# Patient Record
Sex: FEMALE | URBAN - METROPOLITAN AREA
[De-identification: names, ages, dates, MRNs, and addresses within clinical notes are randomized per-mention and may not be internally consistent; named-entity substitution may affect disease eponyms.]

---

## 2019-08-30 ENCOUNTER — OFFICE VISIT (OUTPATIENT)
Dept: URBAN - METROPOLITAN AREA CLINIC 11 | Facility: CLINIC | Age: 54
End: 2019-08-30
Payer: COMMERCIAL

## 2019-08-30 PROCEDURE — 92004 COMPRE OPH EXAM NEW PT 1/>: CPT | Performed by: OPHTHALMOLOGY

## 2019-08-30 PROCEDURE — 92133 CPTRZD OPH DX IMG PST SGM ON: CPT | Performed by: OPHTHALMOLOGY

## 2019-08-30 PROCEDURE — 92020 GONIOSCOPY: CPT | Performed by: OPHTHALMOLOGY

## 2019-08-30 PROCEDURE — 76514 ECHO EXAM OF EYE THICKNESS: CPT | Performed by: OPHTHALMOLOGY

## 2019-08-30 RX ORDER — BIMATOPROST 0.1 MG/ML
0.01 % SOLUTION/ DROPS OPHTHALMIC
Qty: 1 | Refills: 11 | Status: INACTIVE
Start: 2019-08-30 | End: 2019-11-07

## 2019-08-30 ASSESSMENT — INTRAOCULAR PRESSURE
OD: 23
OS: 29
OD: 26
OS: 26

## 2019-08-30 ASSESSMENT — KERATOMETRY
OD: 46.13
OS: 46.88

## 2019-08-30 NOTE — IMPRESSION/PLAN
Impression: Ocular hypertension, bilateral: H40.053. /551,  OCT 89/88  Plan: Ocular hypertension, intraocular pressure too high, will start medication(s). Start Lumigan QHS OU (sample dispensed to patient) Diagnosis explained and patient verbalized understanding. Will continue to monitor. 4-6 week IOP check, Optos and HVF with Dr Eva Rees.

## 2019-10-18 ENCOUNTER — OFFICE VISIT (OUTPATIENT)
Dept: URBAN - METROPOLITAN AREA CLINIC 11 | Facility: CLINIC | Age: 54
End: 2019-10-18
Payer: COMMERCIAL

## 2019-10-18 PROCEDURE — 99213 OFFICE O/P EST LOW 20 MIN: CPT | Performed by: OPHTHALMOLOGY

## 2019-10-18 ASSESSMENT — INTRAOCULAR PRESSURE
OS: 27
OD: 20

## 2019-10-18 NOTE — IMPRESSION/PLAN
Impression: Ocular hypertension, bilateral: H40.053. /551, 8/19 OCT 89/88 9/7 Plan: Ocular hypertension. IOP improved OD, elevated OS. Recommend patient continue Lumigan QHS OU. Will continue to monitor. Obtain HVF. Follow up IOP check and review HVF with patient in 3 months with Dr Hola Marti.

## 2019-10-21 ENCOUNTER — TESTING ONLY (OUTPATIENT)
Dept: URBAN - METROPOLITAN AREA CLINIC 11 | Facility: CLINIC | Age: 54
End: 2019-10-21
Payer: COMMERCIAL

## 2019-10-21 PROCEDURE — 92083 EXTENDED VISUAL FIELD XM: CPT | Performed by: OPHTHALMOLOGY

## 2020-01-30 ENCOUNTER — OFFICE VISIT (OUTPATIENT)
Dept: URBAN - METROPOLITAN AREA CLINIC 11 | Facility: CLINIC | Age: 55
End: 2020-01-30
Payer: COMMERCIAL

## 2020-01-30 PROCEDURE — 99202 OFFICE O/P NEW SF 15 MIN: CPT | Performed by: OPTOMETRIST

## 2020-01-30 PROCEDURE — 99213 OFFICE O/P EST LOW 20 MIN: CPT | Performed by: OPTOMETRIST

## 2020-01-30 ASSESSMENT — INTRAOCULAR PRESSURE
OD: 20
OS: 24

## 2020-01-30 NOTE — IMPRESSION/PLAN
Impression: Ocular hypertension, bilateral: H40.053. /551, 8/19 OCT 89/88 9/7 Plan: Ocular hypertension. IOP  good level today OU . Recommend patient continue Lumigan QHS OU. Will continue to monitor. Follow up 4 months IOP check and photos.

## 2020-05-19 ENCOUNTER — OFFICE VISIT (OUTPATIENT)
Dept: URBAN - METROPOLITAN AREA CLINIC 11 | Facility: CLINIC | Age: 55
End: 2020-05-19
Payer: COMMERCIAL

## 2020-05-19 DIAGNOSIS — H40.053 OCULAR HYPERTENSION, BILATERAL: Primary | ICD-10-CM

## 2020-05-19 PROCEDURE — 92250 FUNDUS PHOTOGRAPHY W/I&R: CPT | Performed by: OPTOMETRIST

## 2020-05-19 PROCEDURE — 92012 INTRM OPH EXAM EST PATIENT: CPT | Performed by: OPTOMETRIST

## 2020-05-19 ASSESSMENT — INTRAOCULAR PRESSURE
OS: 26
OD: 21

## 2020-05-19 NOTE — IMPRESSION/PLAN
Impression: Ocular hypertension, bilateral: H40.053. /551, 8/19 OCT 89/88 9/7 Plan: Ocular hypertension. IOP slightly elevated today OU . continue Lumigan QHS OU. Will continue to monitor. Schedule VF 24-2. VF results need to be sent to her neurologist due to pituitary tumor.  Follow up 1-2 weeks VF, IOP, DE and OCT RNFL

## 2020-06-15 ENCOUNTER — OFFICE VISIT (OUTPATIENT)
Dept: URBAN - METROPOLITAN AREA CLINIC 11 | Facility: CLINIC | Age: 55
End: 2020-06-15
Payer: COMMERCIAL

## 2020-06-15 PROCEDURE — 92014 COMPRE OPH EXAM EST PT 1/>: CPT | Performed by: OPTOMETRIST

## 2020-06-15 PROCEDURE — 92083 EXTENDED VISUAL FIELD XM: CPT | Performed by: OPTOMETRIST

## 2020-06-15 PROCEDURE — 92133 CPTRZD OPH DX IMG PST SGM ON: CPT | Performed by: OPTOMETRIST

## 2020-06-15 ASSESSMENT — INTRAOCULAR PRESSURE
OD: 20
OS: 21

## 2020-06-15 NOTE — IMPRESSION/PLAN
Impression: Ocular hypertension, bilateral: H40.053. /551, OCT 06/20 8/9 89/85 (89/88) HVF 06/20 OD: Full; OS: Full Plan: VF and OCT RNFL ordered and preformed today. VF and OCT normal OU. IOP acceptable level today OU . Continue Latanoprost QHS OU. Will continue to monitor.  F/U 4 months IOP & Photos

## 2020-09-30 ENCOUNTER — OFFICE VISIT (OUTPATIENT)
Dept: URBAN - METROPOLITAN AREA CLINIC 11 | Facility: CLINIC | Age: 55
End: 2020-09-30
Payer: COMMERCIAL

## 2020-09-30 PROCEDURE — 92250 FUNDUS PHOTOGRAPHY W/I&R: CPT | Performed by: OPTOMETRIST

## 2020-09-30 PROCEDURE — 92012 INTRM OPH EXAM EST PATIENT: CPT | Performed by: OPTOMETRIST

## 2020-09-30 RX ORDER — LATANOPROST 50 UG/ML
0.005 % SOLUTION OPHTHALMIC
Qty: 2.5 | Refills: 2 | Status: INACTIVE
Start: 2020-09-30 | End: 2021-01-22

## 2020-09-30 ASSESSMENT — INTRAOCULAR PRESSURE
OS: 24
OD: 19

## 2020-09-30 NOTE — ASSESSMENT/PLAN
Impression: Photo Disc - OD: Good-rim intact; OS Good-rim intact Plan: Continue drops. No change in treatment based on test results.

## 2020-09-30 NOTE — IMPRESSION/PLAN
Impression: Ocular hypertension, bilateral: H40.053. /551, OCT 06/20 8/9 89/85 (89/88) HVF 06/20 OD: Full; OS: Full Photos 09/20 Plan: Photos of the optic nerve ordered and preformed today. IOP slightly elevated OS. Continue Latanoprost QHS OU. Will continue to monitor.  F/U 4 months IOP

## 2021-02-02 ENCOUNTER — OFFICE VISIT (OUTPATIENT)
Dept: URBAN - METROPOLITAN AREA CLINIC 11 | Facility: CLINIC | Age: 56
End: 2021-02-02
Payer: COMMERCIAL

## 2021-02-02 PROCEDURE — 92134 CPTRZ OPH DX IMG PST SGM RTA: CPT | Performed by: OPTOMETRIST

## 2021-02-02 PROCEDURE — 99213 OFFICE O/P EST LOW 20 MIN: CPT | Performed by: OPTOMETRIST

## 2021-02-02 ASSESSMENT — INTRAOCULAR PRESSURE
OD: 22
OS: 24

## 2021-02-02 NOTE — IMPRESSION/PLAN
Impression: Ocular hypertension, bilateral: H40.053. Tmax 26/29 /551, OCT 06/20 8/9 89/85 (89/88) HVF 06/20 OD: Full; OS: Full Photos 09/20 [facility] 02/21 84/83 Plan: MAC OCT ([facility]) ordered and preformed today. Normal OU. IOP slightly elevated OU. Continue Latanoprost QHS OU. Will continue to monitor.  F/U 4 months IOP, DE & OCT RNFL

## 2021-06-02 ENCOUNTER — OFFICE VISIT (OUTPATIENT)
Dept: URBAN - METROPOLITAN AREA CLINIC 11 | Facility: CLINIC | Age: 56
End: 2021-06-02
Payer: COMMERCIAL

## 2021-06-02 DIAGNOSIS — E11.9 TYPE 2 DIABETES MELLITUS W/O COMPLICATION: ICD-10-CM

## 2021-06-02 PROCEDURE — 92014 COMPRE OPH EXAM EST PT 1/>: CPT | Performed by: OPTOMETRIST

## 2021-06-02 PROCEDURE — 92133 CPTRZD OPH DX IMG PST SGM ON: CPT | Performed by: OPTOMETRIST

## 2021-06-02 ASSESSMENT — INTRAOCULAR PRESSURE
OS: 21
OD: 20

## 2021-06-02 NOTE — IMPRESSION/PLAN
Impression: Ocular hypertension, bilateral: H40.053. Tmax 26/29 /551, OCT 06/21 8/8 92/81 (89/85)(89/88) HVF 06/20 OD: Full; OS: Full Photos 09/20 1808 [facility] 02/21 84/83 Plan: OCT RNFL ordered and preformed today. Normal OU. IOP at good level today OU. Continue Latanoprost QHS OU. Will continue to monitor.  f/u 6 months IOP & VF

## 2021-06-02 NOTE — IMPRESSION/PLAN
Impression: Type 2 diabetes mellitus w/o complication: J98.2. Plan: No signs of retinopathy or neovascularization noted. Discussed ocular and systemic benefits of blood sugar control. Continue blood sugar control.  RTC 1yr complete exam

## 2021-12-08 ENCOUNTER — OFFICE VISIT (OUTPATIENT)
Dept: URBAN - METROPOLITAN AREA CLINIC 11 | Facility: CLINIC | Age: 56
End: 2021-12-08
Payer: COMMERCIAL

## 2021-12-08 PROCEDURE — 92083 EXTENDED VISUAL FIELD XM: CPT | Performed by: OPTOMETRIST

## 2021-12-08 PROCEDURE — 99213 OFFICE O/P EST LOW 20 MIN: CPT | Performed by: OPTOMETRIST

## 2021-12-08 ASSESSMENT — INTRAOCULAR PRESSURE
OS: 24
OD: 22

## 2021-12-08 NOTE — IMPRESSION/PLAN
Impression: Ocular hypertension, bilateral: H40.053. Tmax 26/29; /551; OCT 06/21 8/8 92/81 (89/85)(89/88) HVF 12/21 OD: Full; OS: Full; Photos 09/20; 1808 [facility] 02/21 84/83 Plan: VF ordered and preformed today. VF normal OU. IOP slightly elevated OU. Continue Latanoprost QHS OU. Will continue to monitor.  f/u 6 months DE and OCT RNFL

## 2022-09-27 ENCOUNTER — OFFICE VISIT (OUTPATIENT)
Facility: LOCATION | Age: 57
End: 2022-09-27
Payer: COMMERCIAL

## 2022-09-27 DIAGNOSIS — H40.053 OCULAR HYPERTENSION, BILATERAL: Primary | ICD-10-CM

## 2022-09-27 DIAGNOSIS — H25.13 AGE-RELATED NUCLEAR CATARACT, BILATERAL: ICD-10-CM

## 2022-09-27 PROCEDURE — 92002 INTRM OPH EXAM NEW PATIENT: CPT | Performed by: OPTOMETRIST

## 2022-09-27 PROCEDURE — 92133 CPTRZD OPH DX IMG PST SGM ON: CPT | Performed by: OPTOMETRIST

## 2022-09-27 RX ORDER — LATANOPROST 50 UG/ML
0.005 % SOLUTION OPHTHALMIC
Qty: 2.5 | Refills: 11 | Status: ACTIVE
Start: 2022-09-27

## 2022-09-27 ASSESSMENT — INTRAOCULAR PRESSURE
OS: 21
OD: 20
OD: 21

## 2022-09-27 NOTE — IMPRESSION/PLAN
Impression: Ocular hypertension, bilateral: H40.053. Tmax 26/29; /551; OCT 06/21 8/8 92/81 (89/85)(89/88) HVF 12/21 OD: Full; OS: Full; Photos 09/20; 1808 [facility] 02/21 84/83 Plan: IOP stable OU. RNFL performed today with no thinning and remains stable. Contiune Latanoprost QHS OU. Return in 6 months with dilation and VF 24-2 OU. Tmax 26/29 Target 21 or below Treatment Latanoporst QHS OU

## 2023-03-27 ENCOUNTER — OFFICE VISIT (OUTPATIENT)
Facility: LOCATION | Age: 58
End: 2023-03-27
Payer: COMMERCIAL

## 2023-03-27 DIAGNOSIS — H40.053 OCULAR HYPERTENSION, BILATERAL: Primary | ICD-10-CM

## 2023-03-27 PROCEDURE — 99213 OFFICE O/P EST LOW 20 MIN: CPT | Performed by: OPTOMETRIST

## 2023-03-27 PROCEDURE — 92083 EXTENDED VISUAL FIELD XM: CPT | Performed by: OPTOMETRIST

## 2023-03-27 RX ORDER — LATANOPROST 50 UG/ML
0.005 % SOLUTION OPHTHALMIC
Qty: 2.5 | Refills: 4 | Status: ACTIVE
Start: 2023-03-27

## 2023-03-27 ASSESSMENT — KERATOMETRY
OD: 47.25
OS: 43.50

## 2023-03-27 ASSESSMENT — INTRAOCULAR PRESSURE
OD: 16
OS: 17

## 2023-03-27 NOTE — IMPRESSION/PLAN
Impression: Ocular hypertension, bilateral: H40.053. Plan: Condition and IOP are stable today. No changes being made to current treatment at this time. Continue Latnaoprost QHS OU. Emphasized and explained compliance. Reassured patient of current condition and treatment and discussed risks of glaucoma progression. Will continue to monitor IOP. IOP: 16/16 Tmax 26/29 Target 21 or below Treatment Latanoporst QHS OU
C/D ratio: 0.3/0.3 OCTG (date):
24-2 (3/27/23): FUL OU Pachy (date): Allergies: none Surgery: none Family History: unk Notes:

## 2023-04-03 ENCOUNTER — OFFICE VISIT (OUTPATIENT)
Facility: LOCATION | Age: 58
End: 2023-04-03
Payer: COMMERCIAL

## 2023-04-03 DIAGNOSIS — H40.053 OCULAR HYPERTENSION, BILATERAL: ICD-10-CM

## 2023-04-03 DIAGNOSIS — H25.13 AGE-RELATED NUCLEAR CATARACT, BILATERAL: ICD-10-CM

## 2023-04-03 DIAGNOSIS — E11.9 TYPE 2 DIABETES MELLITUS W/O COMPLICATION: Primary | ICD-10-CM

## 2023-04-03 PROCEDURE — 92014 COMPRE OPH EXAM EST PT 1/>: CPT | Performed by: OPTOMETRIST

## 2023-04-03 ASSESSMENT — INTRAOCULAR PRESSURE
OD: 23
OS: 24
OS: 23
OD: 21

## 2023-04-03 NOTE — IMPRESSION/PLAN
Impression: Ocular hypertension, bilateral: H40.053. Plan: IOPs elevated today. Patient reports recently missing Latanoprost dosage. No changes being made to current treatment at this time. Continue Latnaoprost QHS OU. Emphasized and explained compliance. Reassured patient of current condition and treatment and discussed risks of glaucoma progression. Will continue to monitor IOP. RTC: 3 month IOP check with [facility] OCT

IOP: 23/23 Tmax 26/29 Target 21 or below Treatment Latanoporst QHS OU
C/D ratio: 0.3/0.3
24-2 (3/27/23): FUL OU Allergies: none Surgery: none Family History: unknown Notes:

## 2023-04-03 NOTE — IMPRESSION/PLAN
Impression: Type 2 diabetes mellitus w/o complication: V66.6. Plan: Diabetes type II: no background retinopathy, no signs of neovascularization noted. Discussed ocular and systemic benefits of blood sugar control.

## 2023-07-03 ENCOUNTER — OFFICE VISIT (OUTPATIENT)
Facility: LOCATION | Age: 58
End: 2023-07-03
Payer: COMMERCIAL

## 2023-07-03 DIAGNOSIS — H40.053 OCULAR HYPERTENSION, BILATERAL: Primary | ICD-10-CM

## 2023-07-03 PROCEDURE — 99213 OFFICE O/P EST LOW 20 MIN: CPT | Performed by: OPTOMETRIST

## 2023-07-03 PROCEDURE — 92134 CPTRZ OPH DX IMG PST SGM RTA: CPT | Performed by: OPTOMETRIST

## 2023-07-03 ASSESSMENT — INTRAOCULAR PRESSURE
OS: 18
OD: 14

## 2024-11-26 ENCOUNTER — OFFICE VISIT (OUTPATIENT)
Facility: LOCATION | Age: 59
End: 2024-11-26
Payer: COMMERCIAL

## 2024-11-26 DIAGNOSIS — H04.123 TEAR FILM INSUFFICIENCY OF BILATERAL LACRIMAL GLANDS: ICD-10-CM

## 2024-11-26 DIAGNOSIS — H40.053 OCULAR HYPERTENSION, BILATERAL: ICD-10-CM

## 2024-11-26 DIAGNOSIS — E11.9 TYPE 2 DIABETES MELLITUS W/O COMPLICATION: Primary | ICD-10-CM

## 2024-11-26 DIAGNOSIS — H25.13 AGE-RELATED NUCLEAR CATARACT, BILATERAL: ICD-10-CM

## 2024-11-26 PROCEDURE — 92014 COMPRE OPH EXAM EST PT 1/>: CPT | Performed by: OPTOMETRIST

## 2024-11-26 ASSESSMENT — INTRAOCULAR PRESSURE
OS: 17
OD: 17
OS: 20

## 2024-11-26 ASSESSMENT — KERATOMETRY
OD: 45.88
OS: 46.38

## 2024-11-26 ASSESSMENT — VISUAL ACUITY
OS: 20/20
OD: 20/20

## 2025-05-27 ENCOUNTER — OFFICE VISIT (OUTPATIENT)
Facility: LOCATION | Age: 60
End: 2025-05-27
Payer: COMMERCIAL

## 2025-05-27 DIAGNOSIS — H40.053 OCULAR HYPERTENSION, BILATERAL: Primary | ICD-10-CM

## 2025-05-27 PROCEDURE — 99213 OFFICE O/P EST LOW 20 MIN: CPT | Performed by: OPTOMETRIST

## 2025-05-27 PROCEDURE — 92133 CPTRZD OPH DX IMG PST SGM ON: CPT | Performed by: OPTOMETRIST

## 2025-05-27 RX ORDER — LATANOPROST 50 UG/ML
0.005 % SOLUTION OPHTHALMIC
Qty: 7.5 | Refills: 1 | Status: ACTIVE
Start: 2025-05-27

## 2025-05-27 ASSESSMENT — INTRAOCULAR PRESSURE
OD: 17
OS: 16